# Patient Record
Sex: MALE | Race: OTHER | NOT HISPANIC OR LATINO | URBAN - METROPOLITAN AREA
[De-identification: names, ages, dates, MRNs, and addresses within clinical notes are randomized per-mention and may not be internally consistent; named-entity substitution may affect disease eponyms.]

---

## 2017-10-31 ENCOUNTER — INPATIENT (INPATIENT)
Facility: HOSPITAL | Age: 67
LOS: 0 days | Discharge: HOME | DRG: 305 | End: 2017-11-01
Attending: HOSPITALIST | Admitting: HOSPITALIST
Payer: MEDICAID

## 2017-10-31 VITALS
OXYGEN SATURATION: 97 % | SYSTOLIC BLOOD PRESSURE: 247 MMHG | HEIGHT: 64 IN | DIASTOLIC BLOOD PRESSURE: 95 MMHG | HEART RATE: 70 BPM | WEIGHT: 145.06 LBS | RESPIRATION RATE: 18 BRPM | TEMPERATURE: 98 F

## 2017-10-31 DIAGNOSIS — I16.1 HYPERTENSIVE EMERGENCY: ICD-10-CM

## 2017-10-31 LAB
ALBUMIN SERPL ELPH-MCNC: 4.1 G/DL — SIGNIFICANT CHANGE UP (ref 3.3–5.2)
ALP SERPL-CCNC: 107 U/L — SIGNIFICANT CHANGE UP (ref 40–120)
ALT FLD-CCNC: 14 U/L — SIGNIFICANT CHANGE UP
ANION GAP SERPL CALC-SCNC: 13 MMOL/L — SIGNIFICANT CHANGE UP (ref 5–17)
APTT BLD: 29 SEC — SIGNIFICANT CHANGE UP (ref 27.5–37.4)
AST SERPL-CCNC: 24 U/L — SIGNIFICANT CHANGE UP
BASOPHILS # BLD AUTO: 0.1 K/UL — SIGNIFICANT CHANGE UP (ref 0–0.2)
BASOPHILS NFR BLD AUTO: 1.2 % — SIGNIFICANT CHANGE UP (ref 0–2)
BILIRUB SERPL-MCNC: 0.6 MG/DL — SIGNIFICANT CHANGE UP (ref 0.4–2)
BUN SERPL-MCNC: 25 MG/DL — HIGH (ref 8–20)
CALCIUM SERPL-MCNC: 9 MG/DL — SIGNIFICANT CHANGE UP (ref 8.6–10.2)
CHLORIDE SERPL-SCNC: 101 MMOL/L — SIGNIFICANT CHANGE UP (ref 98–107)
CK SERPL-CCNC: 119 U/L — SIGNIFICANT CHANGE UP (ref 30–200)
CO2 SERPL-SCNC: 27 MMOL/L — SIGNIFICANT CHANGE UP (ref 22–29)
CREAT SERPL-MCNC: 1.54 MG/DL — HIGH (ref 0.5–1.3)
EOSINOPHIL # BLD AUTO: 0.2 K/UL — SIGNIFICANT CHANGE UP (ref 0–0.5)
EOSINOPHIL NFR BLD AUTO: 3 % — SIGNIFICANT CHANGE UP (ref 0–5)
GLUCOSE SERPL-MCNC: 110 MG/DL — SIGNIFICANT CHANGE UP (ref 70–115)
HCT VFR BLD CALC: 38.6 % — LOW (ref 42–52)
HGB BLD-MCNC: 12.8 G/DL — LOW (ref 14–18)
INR BLD: 0.96 RATIO — SIGNIFICANT CHANGE UP (ref 0.88–1.16)
LYMPHOCYTES # BLD AUTO: 1.5 K/UL — SIGNIFICANT CHANGE UP (ref 1–4.8)
LYMPHOCYTES # BLD AUTO: 19.7 % — LOW (ref 20–55)
MCHC RBC-ENTMCNC: 27.4 PG — SIGNIFICANT CHANGE UP (ref 27–31)
MCHC RBC-ENTMCNC: 33.2 G/DL — SIGNIFICANT CHANGE UP (ref 32–36)
MCV RBC AUTO: 82.7 FL — SIGNIFICANT CHANGE UP (ref 80–94)
MONOCYTES # BLD AUTO: 0.6 K/UL — SIGNIFICANT CHANGE UP (ref 0–0.8)
MONOCYTES NFR BLD AUTO: 8.5 % — SIGNIFICANT CHANGE UP (ref 3–10)
NEUTROPHILS # BLD AUTO: 5.2 K/UL — SIGNIFICANT CHANGE UP (ref 1.8–8)
NEUTROPHILS NFR BLD AUTO: 67.3 % — SIGNIFICANT CHANGE UP (ref 37–73)
PLATELET # BLD AUTO: 234 K/UL — SIGNIFICANT CHANGE UP (ref 150–400)
POTASSIUM SERPL-MCNC: 3.7 MMOL/L — SIGNIFICANT CHANGE UP (ref 3.5–5.3)
POTASSIUM SERPL-SCNC: 3.7 MMOL/L — SIGNIFICANT CHANGE UP (ref 3.5–5.3)
PROT SERPL-MCNC: 7.7 G/DL — SIGNIFICANT CHANGE UP (ref 6.6–8.7)
PROTHROM AB SERPL-ACNC: 10.5 SEC — SIGNIFICANT CHANGE UP (ref 9.8–12.7)
RBC # BLD: 4.67 M/UL — SIGNIFICANT CHANGE UP (ref 4.6–6.2)
RBC # FLD: 14.9 % — SIGNIFICANT CHANGE UP (ref 11–15.6)
SODIUM SERPL-SCNC: 141 MMOL/L — SIGNIFICANT CHANGE UP (ref 135–145)
TROPONIN T SERPL-MCNC: <0.01 NG/ML — SIGNIFICANT CHANGE UP (ref 0–0.06)
WBC # BLD: 7.6 K/UL — SIGNIFICANT CHANGE UP (ref 4.8–10.8)
WBC # FLD AUTO: 7.6 K/UL — SIGNIFICANT CHANGE UP (ref 4.8–10.8)

## 2017-10-31 PROCEDURE — 99222 1ST HOSP IP/OBS MODERATE 55: CPT

## 2017-10-31 PROCEDURE — 76775 US EXAM ABDO BACK WALL LIM: CPT | Mod: 26

## 2017-10-31 PROCEDURE — 93010 ELECTROCARDIOGRAM REPORT: CPT

## 2017-10-31 PROCEDURE — 70450 CT HEAD/BRAIN W/O DYE: CPT | Mod: 26

## 2017-10-31 PROCEDURE — 99291 CRITICAL CARE FIRST HOUR: CPT

## 2017-10-31 RX ORDER — PANTOPRAZOLE SODIUM 20 MG/1
40 TABLET, DELAYED RELEASE ORAL
Qty: 0 | Refills: 0 | Status: DISCONTINUED | OUTPATIENT
Start: 2017-10-31 | End: 2017-11-01

## 2017-10-31 RX ORDER — LABETALOL HCL 100 MG
100 TABLET ORAL THREE TIMES A DAY
Qty: 0 | Refills: 0 | Status: DISCONTINUED | OUTPATIENT
Start: 2017-10-31 | End: 2017-11-01

## 2017-10-31 RX ORDER — HYDRALAZINE HCL 50 MG
10 TABLET ORAL ONCE
Qty: 0 | Refills: 0 | Status: COMPLETED | OUTPATIENT
Start: 2017-10-31 | End: 2017-10-31

## 2017-10-31 RX ORDER — LABETALOL HCL 100 MG
10 TABLET ORAL ONCE
Qty: 0 | Refills: 0 | Status: COMPLETED | OUTPATIENT
Start: 2017-10-31 | End: 2017-10-31

## 2017-10-31 RX ORDER — HYDRALAZINE HCL 50 MG
10 TABLET ORAL EVERY 6 HOURS
Qty: 0 | Refills: 0 | Status: DISCONTINUED | OUTPATIENT
Start: 2017-10-31 | End: 2017-11-01

## 2017-10-31 RX ADMIN — Medication 10 MILLIGRAM(S): at 12:40

## 2017-10-31 RX ADMIN — Medication 10 MILLIGRAM(S): at 11:35

## 2017-10-31 RX ADMIN — Medication 10 MILLIGRAM(S): at 23:15

## 2017-10-31 RX ADMIN — Medication 1 MILLIGRAM(S): at 13:16

## 2017-10-31 RX ADMIN — Medication 100 MILLIGRAM(S): at 23:13

## 2017-10-31 RX ADMIN — Medication 10 MILLIGRAM(S): at 17:56

## 2017-10-31 NOTE — ED PROVIDER NOTE - CRITICAL CARE PROVIDED
interpretation of diagnostic studies/consult w/ pt's family directly relating to pts condition/direct patient care (not related to procedure)/documentation/60 MINUTES/consultation with other physicians

## 2017-10-31 NOTE — ED ADULT NURSE NOTE - CHIEF COMPLAINT QUOTE
pt sent in by [facility] for HTN crisis. pt AOX3 on arrival, c/o headache. no chest pain/SOB. given 10mg IV hydralazine at cardio office. even and unlabored resps.

## 2017-10-31 NOTE — ED PROVIDER NOTE - CRITICAL CARE INDICATION, MLM
patient was critically ill... PT WITH HYPERTENSIVE EMERGENCY. NEEDED MULTIPLE DOSES OF ANTIHYPERTENSIVES. SERIAL BP MONITORING WILL ADM IT TO MONITOR BP. DR RAMIREZ TO ADMIT

## 2017-10-31 NOTE — H&P ADULT - HISTORY OF PRESENT ILLNESS
Pt is 67 year old male with PMHx of HTN not on any medication at home, was sent to ED from cardiology office for high BP. 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   In Ed patient noted to have HTN emergency with initial /114- s/p hydralazine 1 mg IVP x 1, labetalol 10 mg IVPx1 and ativan 1 mg X 1 in ED and current /67 at the time of admission. Pt Danish speaking, interviewed with . Pt is 67 year old male with PMHx of HTN not on any medication at home, was sent to ED from cardiology office for high BP. 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 During my exam patient reports he was diagnosed with HTN few years ago but not on any antihypertensive at home, he was referred to cardiologist by his eye doctor for pre-op clearance for eye surgery where he was noted to have high BP and sent to ED. He reports very mild headache but no other complaints, denied chest pain, SOB, palpitation, fever, chills, nausea, vomiting. Bowel/bladder habits normal. He denied any other chronic medical problems and is not on any medication at home. In ED patient noted to have HTN emergency with initial /114- s/p hydralazine 1 mg IVP x 1, labetalol 10 mg IVPx1 and ativan 1 mg X 1 in ED and current /67 at the time of admission.

## 2017-10-31 NOTE — ED PROVIDER NOTE - OBJECTIVE STATEMENT
PT WITH HYPERTENSIVE CRISIS SENT FROM [facility]. PT NOT ON MEDS. ASYMTPOMATIC EXCEPT FOR MILD HEADACHE. NO CHEST PAIN OR DIAPHORESIS. NO SOB. NO SMOKING. PT IN HIS USUAL STATES OF HEALTH UNTIL TODAY.  MED HX PT WITH HYPERTENSIVE CRISIS SENT FROM [facility].  RECEIVED HYDRALAZINE IN OFFICE WITHOUT RELIEF. PT NOT ON MEDS. ASYMTPOMATIC EXCEPT FOR MILD HEADACHE. NO CHEST PAIN OR DIAPHORESIS. NO SOB. NO SMOKING. PT IN HIS USUAL STATES OF HEALTH UNTIL TODAY.  MED HX HTN, NEVER ON MEDS, FINANCIAL ISSUE

## 2017-10-31 NOTE — ED ADULT TRIAGE NOTE - CHIEF COMPLAINT QUOTE
pt sent in by [facility] for HTN crisis. pt AOX3 on arrival, c/o headache. no chest pain/SOB. given 10mg IV hydralazine at cardio office. even and unlabored resps.

## 2017-10-31 NOTE — PROGRESS NOTE ADULT - SUBJECTIVE AND OBJECTIVE BOX
Pt sent from my office for admission with malignant HTN , please refer to [facility] for my consultation. Had received Hydralazine and labetalol IV at the ED  Will start labetalol 100 mg PO TID   Discussed with Dr Boo, Telemetry monitoring  Will FUP with you closely      LO TANG MD, FACC, [facility]

## 2017-10-31 NOTE — ED PROVIDER NOTE - CHPI ED SYMPTOMS NEG
no vomiting/no dizziness/no chills/no cough/no syncope/no shortness of breath/no diaphoresis/no chest pain/no fever/no nausea/no back pain

## 2017-10-31 NOTE — H&P ADULT - NSHPLABSRESULTS_GEN_ALL_CORE
LABS:                        12.8   7.6   )-----------( 234      ( 31 Oct 2017 12:01 )             38.6     10-31    141  |  101  |  25.0<H>  ----------------------------<  110  3.7   |  27.0  |  1.54<H>    Ca    9.0      31 Oct 2017 12:01    TPro  7.7  /  Alb  4.1  /  TBili  0.6  /  DBili  x   /  AST  24  /  ALT  14  /  AlkPhos  107  10-31    PT/INR - ( 31 Oct 2017 12:01 )   PT: 10.5 sec;   INR: 0.96 ratio         PTT - ( 31 Oct 2017 12:01 )  PTT:29.0 sec    LIVER FUNCTIONS - ( 31 Oct 2017 12:01 )  Alb: 4.1 g/dL / Pro: 7.7 g/dL / ALK PHOS: 107 U/L / ALT: 14 U/L / AST: 24 U/L / GGT: x               CARDIAC MARKERS ( 31 Oct 2017 12:01 )  x     / <0.01 ng/mL / 119 U/L / x     / x

## 2017-10-31 NOTE — H&P ADULT - ASSESSMENT
Pt is 67 year old male with PMHx of HTN not on any medication at home, was sent to ED from cardiology office (Dr Shafer)  for malignant HTN at office. In ED patient noted to have HTN emergency with initial /114- s/p hydralazine 1 mg IVP x 1, labetalol 10 mg IVPx1 and ativan 1 mg X 1 in ED and current /67 at the time of admission and creatinine 1.5. Admitted to medicine for further management.    plan:    HTN emergency:  - Current /67. Will avoid further drop at this time.  - Obtain stat CT head given mild headache with elevated BP  - Start labetalol 100 mg TID.  - Cardiology consulted from ED. Pt is 67 year old male with PMHx of HTN not on any medication at home, was sent to ED from cardiology office (Dr Shafer)  for malignant HTN at office. In ED patient noted to have HTN emergency with initial /114- s/p hydralazine 1 mg IVP x 1, labetalol 10 mg IVPx1 and ativan 1 mg X 1 in ED and current /67 at the time of admission and creatinine 1.5. Admitted to medicine for further management.    plan:    HTN emergency:  - Current /67.  - Obtain stat CT head given mild headache with elevated BP  - Start labetalol 100 mg TID, hydralazine IVP prn for SBP>180  - Cardiology consulted from ED.    DVT ppx:  - Will start chemical AC after CT head. Pt is 67 year old male with PMHx of HTN not on any medication at home, was sent to ED from cardiology office (Dr Shafer)  for malignant HTN at office. In ED patient noted to have HTN emergency with initial /114- s/p hydralazine 1 mg IVP x 1, labetalol 10 mg IVPx1 and ativan 1 mg X 1 in ED and current /67 at the time of admission and creatinine 1.5. Admitted to medicine for further management.    plan:    HTN emergency:  - Current /67.   - Obtain stat CT head given mild headache with elevated BP  - Start labetalol 100 mg TID, hydralazine IVP prn for SBP>180  - Cardiology consulted from ED.  - Renal US, TTE to r/o hypertensive heart disease.    DVT ppx:  - Will start chemical AC after CT head.

## 2017-10-31 NOTE — ED ADULT NURSE NOTE - CHPI ED SYMPTOMS NEG
no syncope/no chills/no nausea/no cough/no dizziness/no shortness of breath/no vomiting/no diaphoresis/no chest pain/no fever/no back pain

## 2017-10-31 NOTE — ED PROVIDER NOTE - CONSTITUTIONAL, MLM
normal... Well appearing, well nourished, awake, alert, oriented to person, place, time/situation and in no apparent distress. MARKEDLY EELVATED BP NOTED

## 2017-10-31 NOTE — ED ADULT NURSE REASSESSMENT NOTE - NS ED NURSE REASSESS COMMENT FT1
pt sitting calm in bed. a and o x3. breathing even and unlabored. nsr on cm. pt reports mild headache, no other complaints at this time. bp elevated, will medicate as ordered. will continue to monitor.

## 2017-10-31 NOTE — H&P ADULT - NSHPPHYSICALEXAM_GEN_ALL_CORE
PHYSICAL EXAM:    Vital Signs Last 24 Hrs  T(C): 36.7 (31 Oct 2017 11:20), Max: 36.7 (31 Oct 2017 11:20)  T(F): 98 (31 Oct 2017 11:20), Max: 98 (31 Oct 2017 11:20)  HR: 68 (31 Oct 2017 13:41) (58 - 70)  BP: 157/67 (31 Oct 2017 13:41) (157/67 - 252/114)  BP(mean): --  RR: 16 (31 Oct 2017 13:41) (16 - 18)  SpO2: 99% (31 Oct 2017 13:41) (97% - 99%)    GENERAL: Pt lying comfortably, NAD.  ENMT: PERRL, +EOMI.  NECK: soft, Supple, No JVD,   CHEST/LUNG: Clear to auscultation bilaterally; No wheezing.  HEART: S1S2+, Regular rate and rhythm; No murmurs.  ABDOMEN: Soft, Nontender, Nondistended; Bowel sounds present.  MUSCULOSKELETAL: Normal range of motion.  SKIN: No rashes or lesions.  NEURO: AAOX3, no focal deficits, no motor r sensory loss.  PSYCH: normal mood.

## 2017-10-31 NOTE — ED ADULT NURSE REASSESSMENT NOTE - NS ED NURSE REASSESS COMMENT FT1
pt sitting calm in bed. a and o x3. breathing even and unlabored. nsr on cm. pt has no complaints atthis time. vss. will continue to monitor.

## 2017-10-31 NOTE — ED ADULT NURSE NOTE - OBJECTIVE STATEMENT
pt sent in for htn with minor headaches from routine visit to pmd. pt has no other complaints at this time. a and o x3. breathing even and unlabored nsr on cm. will continue to monitor.

## 2017-10-31 NOTE — ED PROVIDER NOTE - MEDICAL DECISION MAKING DETAILS
PT WITH HYPERTENSIVE EMERGENCY. TREATING AND MONITORING BP CLOSELY PT WITH HYPERTENSIVE EMERGENCY. TREATING AND MONITORING BP CLOSELY  PT RECEIVED MULTIPLE DOSES OF ANTIHYPERTENSIVES IV. BP IMPROVED. [facility] TO SEE. NEEDS BP MEDS ON REGULAR BASIS. ALSO NEEDS SW BECAUSE OF UNDISCLOSED ISSUE. PT CRYING EARLIER BUT WOULD NOT TELL RN THE SPECIFICS

## 2017-11-01 VITALS — SYSTOLIC BLOOD PRESSURE: 142 MMHG | DIASTOLIC BLOOD PRESSURE: 80 MMHG

## 2017-11-01 LAB
ANION GAP SERPL CALC-SCNC: 14 MMOL/L — SIGNIFICANT CHANGE UP (ref 5–17)
BUN SERPL-MCNC: 26 MG/DL — HIGH (ref 8–20)
CALCIUM SERPL-MCNC: 8.7 MG/DL — SIGNIFICANT CHANGE UP (ref 8.6–10.2)
CHLORIDE SERPL-SCNC: 102 MMOL/L — SIGNIFICANT CHANGE UP (ref 98–107)
CO2 SERPL-SCNC: 26 MMOL/L — SIGNIFICANT CHANGE UP (ref 22–29)
CREAT SERPL-MCNC: 1.51 MG/DL — HIGH (ref 0.5–1.3)
GLUCOSE SERPL-MCNC: 112 MG/DL — SIGNIFICANT CHANGE UP (ref 70–115)
HCT VFR BLD CALC: 35.9 % — LOW (ref 42–52)
HGB BLD-MCNC: 11.9 G/DL — LOW (ref 14–18)
MCHC RBC-ENTMCNC: 27 PG — SIGNIFICANT CHANGE UP (ref 27–31)
MCHC RBC-ENTMCNC: 33.1 G/DL — SIGNIFICANT CHANGE UP (ref 32–36)
MCV RBC AUTO: 81.4 FL — SIGNIFICANT CHANGE UP (ref 80–94)
PLATELET # BLD AUTO: 241 K/UL — SIGNIFICANT CHANGE UP (ref 150–400)
POTASSIUM SERPL-MCNC: 3.5 MMOL/L — SIGNIFICANT CHANGE UP (ref 3.5–5.3)
POTASSIUM SERPL-SCNC: 3.5 MMOL/L — SIGNIFICANT CHANGE UP (ref 3.5–5.3)
RBC # BLD: 4.41 M/UL — LOW (ref 4.6–6.2)
RBC # FLD: 14.7 % — SIGNIFICANT CHANGE UP (ref 11–15.6)
SODIUM SERPL-SCNC: 142 MMOL/L — SIGNIFICANT CHANGE UP (ref 135–145)
WBC # BLD: 11.1 K/UL — HIGH (ref 4.8–10.8)
WBC # FLD AUTO: 11.1 K/UL — HIGH (ref 4.8–10.8)

## 2017-11-01 PROCEDURE — 85610 PROTHROMBIN TIME: CPT

## 2017-11-01 PROCEDURE — 76775 US EXAM ABDO BACK WALL LIM: CPT

## 2017-11-01 PROCEDURE — 96374 THER/PROPH/DIAG INJ IV PUSH: CPT

## 2017-11-01 PROCEDURE — 99238 HOSP IP/OBS DSCHRG MGMT 30/<: CPT

## 2017-11-01 PROCEDURE — 93005 ELECTROCARDIOGRAM TRACING: CPT

## 2017-11-01 PROCEDURE — 99291 CRITICAL CARE FIRST HOUR: CPT | Mod: 25

## 2017-11-01 PROCEDURE — 96375 TX/PRO/DX INJ NEW DRUG ADDON: CPT

## 2017-11-01 PROCEDURE — 82550 ASSAY OF CK (CPK): CPT

## 2017-11-01 PROCEDURE — 36415 COLL VENOUS BLD VENIPUNCTURE: CPT

## 2017-11-01 PROCEDURE — 70450 CT HEAD/BRAIN W/O DYE: CPT

## 2017-11-01 PROCEDURE — 80053 COMPREHEN METABOLIC PANEL: CPT

## 2017-11-01 PROCEDURE — 85027 COMPLETE CBC AUTOMATED: CPT

## 2017-11-01 PROCEDURE — T1013: CPT

## 2017-11-01 PROCEDURE — 93306 TTE W/DOPPLER COMPLETE: CPT

## 2017-11-01 PROCEDURE — 80048 BASIC METABOLIC PNL TOTAL CA: CPT

## 2017-11-01 PROCEDURE — 84484 ASSAY OF TROPONIN QUANT: CPT

## 2017-11-01 PROCEDURE — 85730 THROMBOPLASTIN TIME PARTIAL: CPT

## 2017-11-01 RX ORDER — LABETALOL HCL 100 MG
1 TABLET ORAL
Qty: 90 | Refills: 0 | OUTPATIENT
Start: 2017-11-01 | End: 2017-12-01

## 2017-11-01 RX ADMIN — Medication 100 MILLIGRAM(S): at 05:40

## 2017-11-01 RX ADMIN — Medication 100 MILLIGRAM(S): at 13:38

## 2017-11-01 RX ADMIN — PANTOPRAZOLE SODIUM 40 MILLIGRAM(S): 20 TABLET, DELAYED RELEASE ORAL at 05:40

## 2017-11-01 NOTE — PROGRESS NOTE ADULT - ASSESSMENT
DC Home cardiac wise  Continue present cardiac therapy  BP control  Compliance discussed in detail with patient in Czech

## 2017-11-01 NOTE — DISCHARGE NOTE ADULT - CARE PROVIDERS DIRECT ADDRESSES
[email or web address],DirectAddress_Unknown,DirectAddress_Unknown

## 2017-11-01 NOTE — PATIENT PROFILE ADULT. - SOCIAL CONCERNS
None
Eyes with no visual disturbances.  Ears clean and dry and no hearing difficulties. Nose with pink mucosa and no drainage.  Mouth mucous membranes moist and pink.  No tenderness or swelling to throat or neck.

## 2017-11-01 NOTE — DISCHARGE NOTE ADULT - CARE PLAN
Principal Discharge DX:	Essential hypertension  Goal:	To control BP  Instructions for follow-up, activity and diet:	Please continue with medication as prescribed. Follow up with your primary medical doctor and cardiology within 1 week.  Secondary Diagnosis:	Stage 2 chronic kidney disease  Instructions for follow-up, activity and diet:	Please continue with medication as prescribed. Follow up with your primary medical doctor and nephrology within 1 week.  Secondary Diagnosis:	Anemia, unspecified type  Instructions for follow-up, activity and diet:	Please continue with medication as prescribed. Follow up with your primary medical doctor within 1 week.

## 2017-11-01 NOTE — DISCHARGE NOTE ADULT - HOSPITAL COURSE
Pt is 67 year old male with PMHx of HTN not on any medication at home, was sent to ED from cardiology office (Dr Shafer)  for malignant HTN at office. In ED patient noted to have HTN emergency with initial /114- s/p hydralazine 1 mg IVP x 1, labetalol 10 mg IVPx1 and ativan 1 mg X 1 in ED and current /67 at the time of admission and creatinine 1.5. Admitted to medicine for further management. Pt started on labetalol and BP under control. Had TTE echo which showed normal EF, CT head negatibe. Cardiology follow up noted and deemed stable for discharge with outpatient follow up. Pt also noted to have creatinine 1.5- stable not trending high- likely undiagnosed underlying CKD from HTN- outpatient follow up with nephrology.   Pt also noted to have mild anemia- hemoglobin 12.8- will follow with his PCP. Pt completely asymptomatic and will be discharged today.    PHYSICAL EXAM:    Vital Signs Last 24 Hrs  T(C): 36.9 (01 Nov 2017 10:22), Max: 37.2 (31 Oct 2017 17:47)  T(F): 98.4 (01 Nov 2017 10:22), Max: 98.9 (31 Oct 2017 17:47)  HR: 64 (01 Nov 2017 10:22) (58 - 76)  BP: 150/60 (01 Nov 2017 10:22) (138/80 - 252/114)  BP(mean): --  RR: 16 (01 Nov 2017 04:03) (16 - 18)  SpO2: 98% (01 Nov 2017 04:03) (98% - 99%)    GENERAL: Pt lying comfortably, NAD.  ENMT: PERRL, +EOMI.  NECK: soft, Supple, No JVD,   CHEST/LUNG: Clear to auscultatation bilaterally; No wheezing.  HEART: S1S2+, Regular rate and rhythm; No murmurs.  ABDOMEN: Soft, Nontender, Nondistended; Bowel sounds present.  MUSCULOSKELETAL: Normal range of motion.  SKIN: No rashes or lesions.  NEURO: AAOX3, no focal deficits, no motor r sensory loss.  PSYCH: normal mood.

## 2017-11-01 NOTE — PROGRESS NOTE ADULT - SUBJECTIVE AND OBJECTIVE BOX
[facility] - [facility], THE [facility]                                   [address] [place] [address]                                                      PHONE: [phone]                                                         FAX: [phone]  [email or web address]  ---------------------------------------------------------------------------------------------------------------------------------    Overnight events/patient complaints: No CP or SOB overnight, BP better control, patient presently in the 4T    PAST MEDICAL & SURGICAL HISTORY:  Essential hypertension  No significant past surgical history    No Known Allergies    MEDICATIONS  (STANDING):  labetalol 100 milliGRAM(s) Oral three times a day  pantoprazole    Tablet 40 milliGRAM(s) Oral before breakfast    MEDICATIONS  (PRN):  hydrALAZINE Injectable 10 milliGRAM(s) IV Push every 6 hours PRN SBP>180      Vital Signs Last 24 Hrs  T(C): 37.2 (01 Nov 2017 04:03), Max: 37.2 (31 Oct 2017 17:47)  T(F): 98.9 (01 Nov 2017 04:03), Max: 98.9 (31 Oct 2017 17:47)  HR: 67 (01 Nov 2017 04:03) (58 - 76)  BP: 138/80 (01 Nov 2017 04:03) (138/80 - 252/114)  BP(mean): --  RR: 16 (01 Nov 2017 04:03) (16 - 18)  SpO2: 98% (01 Nov 2017 04:03) (97% - 99%)  ICU Vital Signs Last 24 Hrs  SUBHASH DIXON  I&O's Detail    31 Oct 2017 07:01  -  01 Nov 2017 07:00  --------------------------------------------------------  IN:  Total IN: 0 mL    OUT:    Voided: 400 mL  Total OUT: 400 mL    Total NET: -400 mL        I&O's Summary    31 Oct 2017 07:01  -  01 Nov 2017 07:00  --------------------------------------------------------  IN: 0 mL / OUT: 400 mL / NET: -400 mL      Drug Dosing Weight  SUBHASH ZACK    REVIEW OF SYSTEMS:    Constitutional: No fever, weight loss or fatigue  Eyes: No eye pain, visual disturbances, or discharge  ENMT:  No difficulty hearing, tinnitus, vertigo; No sinus or throat pain  Neck: No pain or stiffness  Respiratory: No cough, wheezing, chills or hemoptysis  Cardiovascular: No chest pain, palpitations, shortness of breath, dizziness or leg swelling  Gastrointestinal: No abdominal or epigastric pain. No nausea, vomiting or hematemesis; No diarrhea or constipation. No melena or hematochezia.  Genitourinary: No dysuria, frequency, hematuria or incontinence  Rectal: No pain, hemorrhoids or incontinence  Neurological: No headaches, memory loss, loss of strength, numbness or tremors  Skin: No itching, burning, rashes or lesions   Lymph Nodes: No enlarged glands  Endocrine: No heat or cold intolerance; No hair loss  Musculoskeletal: No joint pain or swelling; No muscle, back or extremity pain  Psychiatric: No depression, anxiety, mood swings or difficulty sleeping  Heme/Lymph: No easy bruising or bleeding gums  Allergy and Immunologic: No hives or eczema    PHYSICAL EXAM:  General: Appears well developed, well nourished alert and cooperative.  HEENT: Head; normocephalic, atraumatic.  Eyes: Pupils reactive, cornea wnl.  Neck: Supple, no nodes adenopathy, no NVD or carotid bruit or thyromegaly.  CARDIOVASCULAR: Normal S1 and S2, No murmur, rub, gallop or lift.   LUNGS: No rales, rhonchi or wheeze. Normal breath sounds bilaterally.  ABDOMEN: Soft, nontender without mass or organomegaly. bowel sounds normoactive.  EXTREMITIES: No clubbing, cyanosis or edema. Distal pulses wnl.   SKIN: warm and dry with normal turgor.  NEURO: Alert/oriented x 3/normal motor exam. No pathologic reflexes.    PSYCH: normal affect.        LABS:                        11.9   11.1  )-----------( 241      ( 01 Nov 2017 05:58 )             35.9     11-01    142  |  102  |  26.0<H>  ----------------------------<  112  3.5   |  26.0  |  1.51<H>    Ca    8.7      01 Nov 2017 05:58    TPro  7.7  /  Alb  4.1  /  TBili  0.6  /  DBili  x   /  AST  24  /  ALT  14  /  AlkPhos  107  10-31    SUBHASH DIXON  CARDIAC MARKERS ( 31 Oct 2017 12:01 )  x     / <0.01 ng/mL / 119 U/L / x     / x          PT/INR - ( 31 Oct 2017 12:01 )   PT: 10.5 sec;   INR: 0.96 ratio         PTT - ( 31 Oct 2017 12:01 )  PTT:29.0 sec

## 2017-11-01 NOTE — DISCHARGE NOTE ADULT - PROVIDER TOKENS
TOKEN:'[id number]',TOKEN:'[id number]',FREE:[LAST:[PMD: SHARAN Singer],PHONE:[(   )    -],FAX:[(   )    -]]

## 2017-11-01 NOTE — DISCHARGE NOTE ADULT - PLAN OF CARE
To control BP Please continue with medication as prescribed. Follow up with your primary medical doctor and cardiology within 1 week. Please continue with medication as prescribed. Follow up with your primary medical doctor and nephrology within 1 week. Please continue with medication as prescribed. Follow up with your primary medical doctor within 1 week.

## 2017-11-01 NOTE — DISCHARGE NOTE ADULT - MEDICATION SUMMARY - MEDICATIONS TO TAKE
I will START or STAY ON the medications listed below when I get home from the hospital:    labetalol 100 mg oral tablet  -- 1 tab(s) by mouth 3 times a day  -- Indication: For HTN

## 2017-11-01 NOTE — DISCHARGE NOTE ADULT - PATIENT PORTAL LINK FT
“You can access the [facility] Patient Portal, offered by [facility], by registering with the following website: [email or web address]”

## 2024-02-01 NOTE — ED PROVIDER NOTE - GENITOURINARY NEGATIVE STATEMENT, MLM
BMI: BMI (kg/m2): 21.1 (01-23-24 @ 13:55)  HbA1c: A1C with Estimated Average Glucose Result: 11.0 % (01-23-24 @ 10:23)    Glucose: POCT Blood Glucose.: 162 mg/dL (01-31-24 @ 17:26)    BP: --Vital Signs Last 24 Hrs  T(C): 36.6 (01-31-24 @ 10:12), Max: 36.6 (01-31-24 @ 10:12)  T(F): 97.9 (01-31-24 @ 10:12), Max: 97.9 (01-31-24 @ 10:12)  HR: --  BP: --  BP(mean): --  RR: 18 (01-31-24 @ 10:12) (18 - 18)  SpO2: 100% (01-31-24 @ 10:12) (100% - 100%)    Orthostatic VS  01-31-24 @ 10:12  Lying BP: --/-- HR: --  Sitting BP: 132/79 HR: 93  Standing BP: 134/84 HR: 95  Site: upper right arm  Mode: electronic  Orthostatic VS  01-30-24 @ 07:28  Lying BP: --/-- HR: --  Sitting BP: 131/91 HR: 80  Standing BP: 127/93 HR: 93  Site: upper right arm  Mode: electronic    Lipid Panel: Date/Time: 01-23-24 @ 10:23  Cholesterol, Serum: 169  LDL Cholesterol Calculated: 85  HDL Cholesterol, Serum: 71  Total Cholesterol/HDL Ration Measurement: --  Triglycerides, Serum: 69   BMI: BMI (kg/m2): 21.1 (01-23-24 @ 13:55)  HbA1c: A1C with Estimated Average Glucose Result: 11.0 % (01-23-24 @ 10:23)    Glucose: POCT Blood Glucose.: 315 mg/dL (01-28-24 @ 17:34)    BP: --Vital Signs Last 24 Hrs  T(C): 37.3 (01-28-24 @ 07:06), Max: 37.3 (01-28-24 @ 07:06)  T(F): 99.2 (01-28-24 @ 07:06), Max: 99.2 (01-28-24 @ 07:06)  HR: --  BP: --  BP(mean): --  RR: 16 (01-28-24 @ 07:06) (16 - 16)  SpO2: 100% (01-28-24 @ 07:06) (100% - 100%)    Orthostatic VS  01-28-24 @ 07:06  Lying BP: --/-- HR: --  Sitting BP: 133/80 HR: 70  Standing BP: 129/87 HR: 80  Site: upper right arm  Mode: electronic  Orthostatic VS  01-27-24 @ 07:18  Lying BP: --/-- HR: --  Sitting BP: 129/83 HR: 79  Standing BP: 122/77 HR: 78  Site: upper right arm  Mode: electronic    Lipid Panel: Date/Time: 01-23-24 @ 10:23  Cholesterol, Serum: 169  LDL Cholesterol Calculated: 85  HDL Cholesterol, Serum: 71  Total Cholesterol/HDL Ration Measurement: --  Triglycerides, Serum: 69   BMI: BMI (kg/m2): 21.1 (01-23-24 @ 13:55)  HbA1c: A1C with Estimated Average Glucose Result: 11.0 % (01-23-24 @ 10:23)    Glucose: POCT Blood Glucose.: 165 mg/dL (02-01-24 @ 12:12)    BP: --Vital Signs Last 24 Hrs  T(C): 36.7 (02-01-24 @ 07:56), Max: 36.7 (02-01-24 @ 07:56)  T(F): 98.1 (02-01-24 @ 07:56), Max: 98.1 (02-01-24 @ 07:56)  HR: --  BP: --  BP(mean): --  RR: 18 (02-01-24 @ 07:56) (18 - 18)  SpO2: 100% (02-01-24 @ 07:56) (100% - 100%)    Orthostatic VS  02-01-24 @ 07:56  Lying BP: --/-- HR: --  Sitting BP: 131/85 HR: 94  Standing BP: 117/83 HR: 97  Site: upper right arm  Mode: electronic  Orthostatic VS  01-31-24 @ 10:12  Lying BP: --/-- HR: --  Sitting BP: 132/79 HR: 93  Standing BP: 134/84 HR: 95  Site: upper right arm  Mode: electronic    Lipid Panel: Date/Time: 01-23-24 @ 10:23  Cholesterol, Serum: 169  LDL Cholesterol Calculated: 85  HDL Cholesterol, Serum: 71  Total Cholesterol/HDL Ration Measurement: --  Triglycerides, Serum: 69   BMI: BMI (kg/m2): 21.1 (01-23-24 @ 13:55)  HbA1c: A1C with Estimated Average Glucose Result: 11.0 % (01-23-24 @ 10:23)    Glucose: POCT Blood Glucose.: 189 mg/dL (01-29-24 @ 11:36)    BP: --Vital Signs Last 24 Hrs  T(C): 37 (01-29-24 @ 07:12), Max: 37 (01-29-24 @ 07:12)  T(F): 98.6 (01-29-24 @ 07:12), Max: 98.6 (01-29-24 @ 07:12)  HR: --  BP: --  BP(mean): --  RR: 18 (01-29-24 @ 07:12) (18 - 18)  SpO2: 100% (01-29-24 @ 07:12) (100% - 100%)    Orthostatic VS  01-29-24 @ 07:12  Lying BP: --/-- HR: --  Sitting BP: 154/89 HR: 96  Standing BP: 143/86 HR: 102  Site: upper right arm  Mode: electronic  Orthostatic VS  01-28-24 @ 07:06  Lying BP: --/-- HR: --  Sitting BP: 133/80 HR: 70  Standing BP: 129/87 HR: 80  Site: upper right arm  Mode: electronic    Lipid Panel: Date/Time: 01-23-24 @ 10:23  Cholesterol, Serum: 169  LDL Cholesterol Calculated: 85  HDL Cholesterol, Serum: 71  Total Cholesterol/HDL Ration Measurement: --  Triglycerides, Serum: 69   BMI: BMI (kg/m2): 21.1 (01-23-24 @ 13:55)  HbA1c: A1C with Estimated Average Glucose Result: 11.0 % (01-23-24 @ 10:23)    Glucose: POCT Blood Glucose.: 162 mg/dL (01-31-24 @ 17:26)    BP: --Vital Signs Last 24 Hrs  T(C): 36.6 (01-31-24 @ 10:12), Max: 36.6 (01-31-24 @ 10:12)  T(F): 97.9 (01-31-24 @ 10:12), Max: 97.9 (01-31-24 @ 10:12)  HR: --  BP: --  BP(mean): --  RR: 18 (01-31-24 @ 10:12) (18 - 18)  SpO2: 100% (01-31-24 @ 10:12) (100% - 100%)    Orthostatic VS  01-31-24 @ 10:12  Lying BP: --/-- HR: --  Sitting BP: 132/79 HR: 93  Standing BP: 134/84 HR: 95  Site: upper right arm  Mode: electronic  Orthostatic VS  01-30-24 @ 07:28  Lying BP: --/-- HR: --  Sitting BP: 131/91 HR: 80  Standing BP: 127/93 HR: 93  Site: upper right arm  Mode: electronic    Lipid Panel: Date/Time: 01-23-24 @ 10:23  Cholesterol, Serum: 169  LDL Cholesterol Calculated: 85  HDL Cholesterol, Serum: 71  Total Cholesterol/HDL Ration Measurement: --  Triglycerides, Serum: 69   no dysuria, no frequency, and no hematuria. BMI: BMI (kg/m2): 21.1 (01-23-24 @ 13:55)  HbA1c: A1C with Estimated Average Glucose Result: 11.0 % (01-23-24 @ 10:23)    Glucose: POCT Blood Glucose.: 162 mg/dL (01-31-24 @ 17:26)    BP: --Vital Signs Last 24 Hrs  T(C): 36.6 (01-31-24 @ 10:12), Max: 36.6 (01-31-24 @ 10:12)  T(F): 97.9 (01-31-24 @ 10:12), Max: 97.9 (01-31-24 @ 10:12)  HR: --  BP: --  BP(mean): --  RR: 18 (01-31-24 @ 10:12) (18 - 18)  SpO2: 100% (01-31-24 @ 10:12) (100% - 100%)    Orthostatic VS  01-31-24 @ 10:12  Lying BP: --/-- HR: --  Sitting BP: 132/79 HR: 93  Standing BP: 134/84 HR: 95  Site: upper right arm  Mode: electronic  Orthostatic VS  01-30-24 @ 07:28  Lying BP: --/-- HR: --  Sitting BP: 131/91 HR: 80  Standing BP: 127/93 HR: 93  Site: upper right arm  Mode: electronic    Lipid Panel: Date/Time: 01-23-24 @ 10:23  Cholesterol, Serum: 169  LDL Cholesterol Calculated: 85  HDL Cholesterol, Serum: 71  Total Cholesterol/HDL Ration Measurement: --  Triglycerides, Serum: 69   BMI: BMI (kg/m2): 21.1 (01-23-24 @ 13:55)  HbA1c: A1C with Estimated Average Glucose Result: 11.0 % (01-23-24 @ 10:23)    Glucose: POCT Blood Glucose.: 179 mg/dL (01-25-24 @ 12:09)    BP: 114/71 (01-23-24 @ 12:11) (114/71 - 137/67)Vital Signs Last 24 Hrs  T(C): 36.4 (01-25-24 @ 07:14), Max: 36.4 (01-25-24 @ 07:14)  T(F): 97.6 (01-25-24 @ 07:14), Max: 97.6 (01-25-24 @ 07:14)  HR: --  BP: --  BP(mean): --  RR: 18 (01-25-24 @ 07:14) (18 - 18)  SpO2: 100% (01-25-24 @ 07:14) (100% - 100%)    Orthostatic VS  01-25-24 @ 07:14  Lying BP: --/-- HR: --  Sitting BP: 135/79 HR: 68  Standing BP: 140/77 HR: 75  Site: upper right arm  Mode: electronic  Orthostatic VS  01-24-24 @ 07:49  Lying BP: --/-- HR: --  Sitting BP: 128/74 HR: 69  Standing BP: 114/86 HR: 81  Site: upper right arm  Mode: electronic    Lipid Panel: Date/Time: 01-23-24 @ 10:23  Cholesterol, Serum: 169  LDL Cholesterol Calculated: 85  HDL Cholesterol, Serum: 71  Total Cholesterol/HDL Ration Measurement: --  Triglycerides, Serum: 69   BMI: BMI (kg/m2): 21.1 (01-23-24 @ 13:55)  HbA1c: A1C with Estimated Average Glucose Result: 11.0 % (01-23-24 @ 10:23)    Glucose: POCT Blood Glucose.: 151 mg/dL (01-27-24 @ 17:45)    BP: --Vital Signs Last 24 Hrs  T(C): 37.1 (01-27-24 @ 07:18), Max: 37.1 (01-27-24 @ 07:18)  T(F): 98.7 (01-27-24 @ 07:18), Max: 98.7 (01-27-24 @ 07:18)  HR: --  BP: --  BP(mean): --  RR: 16 (01-27-24 @ 07:18) (16 - 16)  SpO2: 100% (01-27-24 @ 07:18) (100% - 100%)    Orthostatic VS  01-27-24 @ 07:18  Lying BP: --/-- HR: --  Sitting BP: 129/83 HR: 79  Standing BP: 122/77 HR: 78  Site: upper right arm  Mode: electronic  Orthostatic VS  01-26-24 @ 06:56  Lying BP: --/-- HR: --  Sitting BP: 133/75 HR: 79  Standing BP: 131/96 HR: 90  Site: upper right arm  Mode: electronic    Lipid Panel: Date/Time: 01-23-24 @ 10:23  Cholesterol, Serum: 169  LDL Cholesterol Calculated: 85  HDL Cholesterol, Serum: 71  Total Cholesterol/HDL Ration Measurement: --  Triglycerides, Serum: 69   BMI: BMI (kg/m2): 21.1 (01-23-24 @ 13:55)  HbA1c: A1C with Estimated Average Glucose Result: 11.0 % (01-23-24 @ 10:23)    Glucose: POCT Blood Glucose.: 241 mg/dL (01-26-24 @ 17:07)    BP: --Vital Signs Last 24 Hrs  T(C): 36.8 (01-26-24 @ 06:56), Max: 36.8 (01-26-24 @ 06:56)  T(F): 98.3 (01-26-24 @ 06:56), Max: 98.3 (01-26-24 @ 06:56)  HR: --  BP: --  BP(mean): --  RR: 16 (01-26-24 @ 06:56) (16 - 16)  SpO2: 100% (01-26-24 @ 06:56) (100% - 100%)    Orthostatic VS  01-26-24 @ 06:56  Lying BP: --/-- HR: --  Sitting BP: 133/75 HR: 79  Standing BP: 131/96 HR: 90  Site: upper right arm  Mode: electronic  Orthostatic VS  01-25-24 @ 07:14  Lying BP: --/-- HR: --  Sitting BP: 135/79 HR: 68  Standing BP: 140/77 HR: 75  Site: upper right arm  Mode: electronic    Lipid Panel: Date/Time: 01-23-24 @ 10:23  Cholesterol, Serum: 169  LDL Cholesterol Calculated: 85  HDL Cholesterol, Serum: 71  Total Cholesterol/HDL Ration Measurement: --  Triglycerides, Serum: 69